# Patient Record
Sex: MALE | Race: WHITE
[De-identification: names, ages, dates, MRNs, and addresses within clinical notes are randomized per-mention and may not be internally consistent; named-entity substitution may affect disease eponyms.]

---

## 2020-03-10 ENCOUNTER — HOSPITAL ENCOUNTER (EMERGENCY)
Dept: HOSPITAL 62 - ER | Age: 70
LOS: 1 days | Discharge: HOME | End: 2020-03-11
Payer: OTHER GOVERNMENT

## 2020-03-10 DIAGNOSIS — R10.32: ICD-10-CM

## 2020-03-10 DIAGNOSIS — N13.9: Primary | ICD-10-CM

## 2020-03-10 DIAGNOSIS — Z79.82: ICD-10-CM

## 2020-03-10 DIAGNOSIS — I71.4: ICD-10-CM

## 2020-03-10 LAB
ADD MANUAL DIFF: NO
ALBUMIN SERPL-MCNC: 4.7 G/DL (ref 3.5–5)
ALP SERPL-CCNC: 47 U/L (ref 38–126)
ANION GAP SERPL CALC-SCNC: 13 MMOL/L (ref 5–19)
AST SERPL-CCNC: 29 U/L (ref 17–59)
BASOPHILS # BLD AUTO: 0.1 10^3/UL (ref 0–0.2)
BASOPHILS NFR BLD AUTO: 0.9 % (ref 0–2)
BILIRUB DIRECT SERPL-MCNC: 0.4 MG/DL (ref 0–0.4)
BILIRUB SERPL-MCNC: 0.6 MG/DL (ref 0.2–1.3)
BUN SERPL-MCNC: 18 MG/DL (ref 7–20)
CALCIUM: 10 MG/DL (ref 8.4–10.2)
CHLORIDE SERPL-SCNC: 102 MMOL/L (ref 98–107)
CO2 SERPL-SCNC: 22 MMOL/L (ref 22–30)
EOSINOPHIL # BLD AUTO: 0.1 10^3/UL (ref 0–0.6)
EOSINOPHIL NFR BLD AUTO: 0.7 % (ref 0–6)
ERYTHROCYTE [DISTWIDTH] IN BLOOD BY AUTOMATED COUNT: 14.4 % (ref 11.5–14)
GLUCOSE SERPL-MCNC: 154 MG/DL (ref 75–110)
HCT VFR BLD CALC: 46.8 % (ref 37.9–51)
HGB BLD-MCNC: 16.6 G/DL (ref 13.5–17)
LYMPHOCYTES # BLD AUTO: 1.8 10^3/UL (ref 0.5–4.7)
LYMPHOCYTES NFR BLD AUTO: 11.5 % (ref 13–45)
MCH RBC QN AUTO: 31 PG (ref 27–33.4)
MCHC RBC AUTO-ENTMCNC: 35.4 G/DL (ref 32–36)
MCV RBC AUTO: 87 FL (ref 80–97)
MONOCYTES # BLD AUTO: 0.9 10^3/UL (ref 0.1–1.4)
MONOCYTES NFR BLD AUTO: 5.9 % (ref 3–13)
NEUTROPHILS # BLD AUTO: 12.8 10^3/UL (ref 1.7–8.2)
NEUTS SEG NFR BLD AUTO: 81 % (ref 42–78)
PLATELET # BLD: 263 10^3/UL (ref 150–450)
POTASSIUM SERPL-SCNC: 4.3 MMOL/L (ref 3.6–5)
PROT SERPL-MCNC: 8.6 G/DL (ref 6.3–8.2)
RBC # BLD AUTO: 5.35 10^6/UL (ref 4.35–5.55)
TOTAL CELLS COUNTED % (AUTO): 100 %
WBC # BLD AUTO: 15.9 10^3/UL (ref 4–10.5)

## 2020-03-10 PROCEDURE — 96374 THER/PROPH/DIAG INJ IV PUSH: CPT

## 2020-03-10 PROCEDURE — 85025 COMPLETE CBC W/AUTO DIFF WBC: CPT

## 2020-03-10 PROCEDURE — 96375 TX/PRO/DX INJ NEW DRUG ADDON: CPT

## 2020-03-10 PROCEDURE — 81001 URINALYSIS AUTO W/SCOPE: CPT

## 2020-03-10 PROCEDURE — 96361 HYDRATE IV INFUSION ADD-ON: CPT

## 2020-03-10 PROCEDURE — 80053 COMPREHEN METABOLIC PANEL: CPT

## 2020-03-10 PROCEDURE — 36415 COLL VENOUS BLD VENIPUNCTURE: CPT

## 2020-03-10 PROCEDURE — 83690 ASSAY OF LIPASE: CPT

## 2020-03-10 PROCEDURE — 74177 CT ABD & PELVIS W/CONTRAST: CPT

## 2020-03-10 PROCEDURE — 83605 ASSAY OF LACTIC ACID: CPT

## 2020-03-10 PROCEDURE — 99285 EMERGENCY DEPT VISIT HI MDM: CPT

## 2020-03-10 PROCEDURE — 96376 TX/PRO/DX INJ SAME DRUG ADON: CPT

## 2020-03-10 PROCEDURE — 51702 INSERT TEMP BLADDER CATH: CPT

## 2020-03-10 NOTE — ER DOCUMENT REPORT
ED GI/





- General


Stated Complaint: ABDOMINAL PAIN


Time Seen by Provider: 03/10/20 22:55


Primary Care Provider: 


SHARLA REMY MD [NO LOCAL MD] - Follow up as needed


Mode of Arrival: Medic


Information source: Patient


Notes: 





69-year-old man presents to the emergency department with severe lower abdominal

pain which she states began approximately 5 hours prior to arrival.  States he 

was sitting on a couch when he instantaneously developed a severe lower 

abdominal pain.  EMS was called and he was transported to the emergency 

department in severe/pain.  He was given 100 mcg of fentanyl, and states the 

pain has not been affected and he is still screaming secondary to the 

discomfort.  Denies prior history of similar episodes.  He has a history of 

atrial fibrillation, uses aspirin, smokes marijuana and takes no other 

medications.





- Related Data


Allergies/Adverse Reactions: 


                                        





No Known Allergies Allergy (Unverified 03/10/20 23:30)


   











Past Medical History





- Social History


Smoking Status: Unknown if Ever Smoked


Family History: Reviewed & Not Pertinent





Review of Systems





- Review of Systems


Notes: 





Constitutional: Negative for fever.


HENT: Negative for sore throat.


Eyes: Negative for visual changes.


Cardiovascular: Negative for chest pain.


Respiratory: Negative for shortness of breath.


Gastrointestinal: + Abdominal pain, no vomiting or diarrhea.


Genitourinary: Negative for dysuria.


Musculoskeletal: Negative for back pain.


Skin: Negative for rash.


Neurological: Negative for headaches, weakness or numbness.





10 point ROS negative except as marked above and in HPI.





Physical Exam





- Vital signs


Vitals: 


                                        











Temp


 


 98.3 F 


 


 03/10/20 23:00














- Notes


Notes: 





PHYSICAL EXAMINATION:


 


Physical Exam:


General: Well-nourished well-developed  in no acute distress


HEENT: NC/AT, pupils equal round and reactive to light, MM moist,nares clear, 

oropharynx clear, airway patent


Neck: supple, no adenopathy, no masses.  Good range of motion


Lungs: clear, no wheezing, no rales no rhonchi


CVS: Regular rate and rhythm no murmur gallop or rub


Abdomen: Soft, active, + lower abdominal pain with guarding, left lower quadrant

greater than right.  Good bowel sounds, no masses, no masses, no 

hepatosplenomegaly


Ext:   No edema, clubbing or cyanosis.


Neuro: Alert and responsive, moving all 4 extremities on command, cranial nerves

intact, no focal findings


Skin: Intact no open lesions, no rash


PSYCH: Normal mood, normal affect.


 








Course





- Re-evaluation


Re-evalutation: 





20 05:06


I reviewed the patient CT scan reveals a markedly distended bladder, Salas 

catheter was placed approximately 1000 cc of fluid drained.  Patient symptoms 

appear to be related to urinary obstruction, incidental finding of a abdominal 

aneurysm approximately 4 cm extending from below the infrarenal down to the 

bifurcation.  Patient notes that he was aware of a aneurysm and has not had 

follow-up recently.


20 05:46


Vascular surgery at [facility] was contacted Dr. Lantigua, after review of the images and discussion it is felt that the 

patient can follow-up in the clinic as an outpatient.  He notes that if he calls

for an appointment tomorrow he can be scheduled to be seen on Monday of next 

week.  I will shared this information with the patient and encouraged him to 

schedule follow-up for his 4 cm abdominal aortic aneurysm and question of a 

occlusion/stenosis in the left common iliac artery.  





- Vital Signs


Vital signs: 


                                        











Temp Pulse Resp BP Pulse Ox


 


 98.1 F   88   18   154/72 H  97 


 


 20 03:30  20 03:30  20 03:30  20 03:30  20 03:30














- Laboratory


Result Diagrams: 


                                 03/10/20 22:50





                                 03/10/20 22:50


Laboratory results interpreted by me: 


                                        











  03/10/20 03/10/20 03/11/20





  22:50 22:50 00:50


 


WBC  15.9 H  


 


RDW  14.4 H  


 


Lymph % (Auto)  11.5 L  


 


Absolute Neuts (auto)  12.8 H  


 


Seg Neutrophils %  81.0 H  


 


Glucose   154 H 


 


Total Protein   8.6 H 


 


Urine Ketones    TRACE H








I have reviewed laboratory data and used this information for the treatment 

decisions regarding the patient.





- Diagnostic Test


Radiology reviewed: Image reviewed, Reports reviewed - CT of the abdomen and 

pelvis with IV contrast: 4 cm abdominal aortic aneurysm, occlusion versus high-

grade stenosis of the left common iliac artery, distended bladder with 

approximately 1100 cc of fluid and no obstruction seen.





Discharge





- Discharge


Clinical Impression: 


 Urinary obstruction, Abdominal aortic aneurysm (AAA) >39 mm diameter





Condition: Good


Disposition: HOME, SELF-CARE


Additional Instructions: 


You were diagnosed with urinary obstruction in the emergency department today, 

keep the Salas catheter in place until he can follow-up with the urologist.  You

also found to have a 4 cm abdominal aortic aneurysm.  I have spoken to the 

vascular surgeon at [facility] in [facility], [place].  You can make an appointment with Dr. Shon Lantigua, [phone], location [address] [place], [place].  Please call for an 

appointment tomorrow and they can see you in the office early next week.





We will also need to follow-up with a urologist regarding your urinary 

obstruction and Salas catheter placement.  A referral will be provided with the 

discharge paperwork.





HOME CARE INSTRUCTIONS & INFORMATION:  Thank you for choosing us for your 

medical needs. We hope you're satisfied with the care you received.  After you 

leave, you must properly care for your problem and, at the same time, observe 

its progress.  Any condition can change.  Some illnesses can change rapidly over

hours or days.  If your condition worsens, return to the Emergency Department or

see your physician promptly.





ABOUT YOUR X-RAYS AND EKG'S:   If you had an EKG or X-rays taken, they have been

read by the Emergency Physician. The X-rays and EKG's will also be read by a 

Radiologist or Cardiologist within 24 hours.  If discrepancies are noted, you 

will be notified by telephone.  Please be certain the ED has a correct telephone

number & address where you can be reached.  Also, realize that some fractures or

abnormalities do not show up on initial X-rays.  If your symptoms continue, see 

your physician.





ABOUT YOUR LABORATORY TEST:   If you had laboratory tests, the results have been

reviewed by the Emergency Physician.  Some test results (for example cultures) 

may not be available for several days.  You will be contacted if any test result

shows you need additional treatment.  Please be certain the ED has a correct 

telephone number and address where you can be reached.





ABOUT YOUR MEDICATIONS:  You will receive instructions on how to take your 

medicine on the prescription label you receive.  Additional information may be 

provided by the Pharmacy.  If you have questions afterwards, call the ED for 

clarification or further instructions.  Some prescribed medications may cause 

drowsiness.  Do not perform tasks such as driving a car or operating machinery w

ithout consulting your Pharmacist.  If you feel you need a refill of pain 

medication, your condition will need re-evaluation.  Please do not call for a 

refill of any medication.





ABOUT YOUR SIGNATURE:   Signature of this document acknowledges to followin. Understanding that you received emergency treatment and that you may be 

released before al medical problems are known or treated. Please be certain   

the ED has a correct phone number & address where you can be reached.


   2. Acknowledgement that you will arrange for follow-up care as recommended.


   3. Authorization for the Emergency Physician to provide information to your 

follow-up Physician in order to maximize your care.





AT ANY TIME, IF YOUR SYMPTOMS CHANGE SIGNIFICANTLY OR WORSEN OR YOU DEVELOP NEW 

SYMPTOMS, RETURN TO THE EMERGENCY DEPARTMENT IMMEDIATELY FOR RE-EVALUATION.





OUR GOAL IS TO PROVIDE EXCELLENT MEDICAL CARE!





WE HOPE THAT WE HAVE MET YOUR EXPECTATIONS DURING YOUR EMERGENCY DEPARTMENT 

VISIT AND THAT YOU FEEL YOU HAVE RECEIVED EXCELLENT CARE!











Referrals: 


SHARLA REMY MD [NO LOCAL MD] - Follow up as needed

## 2020-03-11 VITALS — SYSTOLIC BLOOD PRESSURE: 152 MMHG | DIASTOLIC BLOOD PRESSURE: 92 MMHG

## 2020-03-11 LAB
ADD MANUAL MICROSCOPIC: YES
APPEARANCE UR: CLEAR
APTT PPP: (no result) S
BILIRUB UR QL STRIP: NEGATIVE
GLUCOSE UR STRIP-MCNC: NEGATIVE MG/DL
HYALINE CASTS #/AREA URNS LPF: (no result) /LPF
KETONES UR STRIP-MCNC: (no result) MG/DL
NITRITE UR QL STRIP: NEGATIVE
PH UR STRIP: 6 [PH] (ref 5–9)
PROT UR STRIP-MCNC: NEGATIVE MG/DL
RBC #/AREA URNS HPF: (no result) /HPF
SP GR UR STRIP: 1.01
UROBILINOGEN UR-MCNC: NEGATIVE MG/DL (ref ?–2)

## 2020-03-11 NOTE — RADIOLOGY REPORT (SQ)
EXAM DESCRIPTION:



[facility]Lex: CT ABDOMEN PELVIS WITH IV CONTRAST 



CLINICAL HISTORY: 

69 years  Male;  LLQ abdominal pain;



TECHNIQUE: 

CT of the abdomen and pelvis using intravenous contrast. 

All CT scans at this facility use dose modulation, iterative

reconstruction, and/or weight based dosing when appropriate to

reduce radiation dose to as low as reasonably achievable.



COMPARISON: None.



FINDINGS:



Abdomen: 

Liver:No focal lesions. No intrahepatic ductal distention.

Gallbladder:Nondistended

Pancreas:Within normal limits

Spleen:Within normal limits

Right kidney: Multiple small cysts. No hydronephrosis. Healed

left several small cysts. No hydronephrosis.

Left kidney:No hydronephrosis. No focal lesion.

Adrenal glands:Within normal limits

Vascular structures: An infrarenal abdominal aortic aneurysm with

mural thrombus measures maximum 4 cm in diameter, beginning

approximately 1 cm below the renal arteries and extending down to

the aortic bifurcation.

High-grade stenosis versus occlusion of the left common iliac

artery. There is filling of the left external iliac artery and

hypogastric.



Pelvis: 

Small bowel:No significant distention.

Appendix:Within normal limits

Colon: Large amount of fecal material in the distal sigmoid and

rectum. Moderate proximal colonic fecal retention, without

proximal distention.

No free intraperitoneal fluid or air. 

Bones: No acute bone findings.

Bladder: Distended, at least 1100 mL. No surrounding edema.

Prostate: 4.7 cm wide.

  

IMPRESSION:

1.  4 cm abdominal aortic aneurysm. Recommend vascular

consultation and annual follow-up.

2.  Occlusion versus high-grade stenosis of the left common iliac

artery

3.  Distended bladder, at least 1100 mL.

4.  No bowel obstruction or acute inflammatory changes.

## 2020-03-12 ENCOUNTER — HOSPITAL ENCOUNTER (EMERGENCY)
Dept: HOSPITAL 62 - ER | Age: 70
Discharge: LEFT BEFORE DISCHARGE | End: 2020-03-12
Payer: OTHER GOVERNMENT

## 2020-03-12 VITALS — DIASTOLIC BLOOD PRESSURE: 90 MMHG | SYSTOLIC BLOOD PRESSURE: 150 MMHG

## 2020-03-12 DIAGNOSIS — F12.10: ICD-10-CM

## 2020-03-12 DIAGNOSIS — F17.200: ICD-10-CM

## 2020-03-12 DIAGNOSIS — Z46.6: ICD-10-CM

## 2020-03-12 DIAGNOSIS — R39.198: Primary | ICD-10-CM

## 2020-03-12 LAB
APPEARANCE UR: CLEAR
APTT PPP: YELLOW S
BACTERIA #/AREA URNS HPF: (no result) /HPF
BILIRUB UR QL STRIP: NEGATIVE
GLUCOSE UR STRIP-MCNC: NEGATIVE MG/DL
KETONES UR STRIP-MCNC: NEGATIVE MG/DL
PH UR STRIP: 6 [PH] (ref 5–9)
PROT UR STRIP-MCNC: NEGATIVE MG/DL
RBC #/AREA URNS HPF: (no result) /HPF
SP GR UR STRIP: 1.01
UROBILINOGEN UR-MCNC: NEGATIVE MG/DL (ref ?–2)

## 2020-03-12 PROCEDURE — 87086 URINE CULTURE/COLONY COUNT: CPT

## 2020-03-12 PROCEDURE — 81001 URINALYSIS AUTO W/SCOPE: CPT

## 2020-03-12 PROCEDURE — 99281 EMR DPT VST MAYX REQ PHY/QHP: CPT

## 2020-03-12 NOTE — ER DOCUMENT REPORT
ED General





- General


Chief Complaint: Problem with Urinary Catheter


Stated Complaint: BLOOD IN CATHETER


Time Seen by Provider: 03/12/20 06:40


TRAVEL OUTSIDE OF THE [place] IN LAST 30 DAYS: No





- HPI


Notes: 





Chief complaint: Problem with Salas catheter





69-year-old male sent here by Dr. Landrum 2 days ago with acute urinary 

retention and required placement of a Salas catheter.  He was also incidentally 

noted to have an aortic aneurysm which did not appear to be acutely leaking and 

questionable occlusion of iliac artery.  He has been referred to a vascular 

surgeon and urologist.  He comes back in today saying that Salas catheter is 

causing him a lot of discomfort and he really wants this taken out if possible. 

He also notes he has had a tiny amount of bleeding at the urethral meatus 

staining his underwear and was concerned about this.  He is not running a fever 

or having chills or vomiting.  Interestingly he notes that he had a large number

of bowel movements after he was discharged and thinks that he may have had a 

fecal impaction.  He wonders if this could have been a contributing factor to 

his difficulty voiding.  He again reiterates that the Salas is very 

uncomfortable and he wants this taken out.  He is not yet made a follow-up 

appointment with urology and says he has problems with transportation.





- Related Data


Allergies/Adverse Reactions: 


                                        





No Known Allergies Allergy (Unverified 03/10/20 23:30)


   











Past Medical History





- General


Information source: Patient





- Social History


Smoking Status: Current Every Day Smoker


Chew tobacco use (# tins/day): No


Frequency of alcohol use: None


Drug Abuse: Marijuana


Family History: Reviewed & Not Pertinent


Patient has suicidal ideation: No


Patient has homicidal ideation: No





- Past Medical History


Cardiac Medical History: Reports: Hx Atrial Fibrillation, Hx Heart Attack


Past Surgical History: Reports: Hx Cardiac Surgery - x6 stents





Review of Systems





- Review of Systems


Notes: 





Constitutional: Negative for fever.


HENT: Negative for sore throat.


Eyes: Negative for visual changes.


Cardiovascular: Negative for chest pain.


Respiratory: Negative for shortness of breath.


Gastrointestinal: Negative for abdominal pain, vomiting or diarrhea.


Genitourinary: As per HPI.


Musculoskeletal: Negative for back pain.


Skin: Negative for rash.


Neurological: Negative for headaches, weakness or numbness.





10 point ROS negative except as marked above and in HPI.








Physical Exam





- Vital signs


Vitals: 


                                        











Temp Pulse Resp BP Pulse Ox


 


 98.1 F   93   18   150/90 H  97 


 


 03/12/20 06:14  03/12/20 06:14  03/12/20 06:14  03/12/20 06:14  03/12/20 06:14














- Notes


Notes: 











GENERAL: Well-developed well-nourished appearing restless and uncomfortable.





SKIN: Good turgor no rashes.





HEAD: Normocephalic atraumatic.





EYES: PERRLA.  EOMI.  Conjunctivae and sclerae clear.





EARS: CANALS AND TMS CLEAR.





NOSE: CLEAR.





MOUTH: Moist mucosa.  Good dentition.  No stridor or edema.  No drooling.





NECK: Supple.  No masses or thyromegaly.  No adenopathy.  Carotids 2+ without 

bruits.  No JVD.





BACK: Symmetrical without tenderness.





CHEST: Respirations unlabored.  Breath sounds clear and symmetrical.





HEART: Regular rhythm.  No murmur gallop or rub.





ABDOMEN: Soft nontender without masses, organomegaly or rebound.  Bowel sounds 

normally active.  No bruits.





GENITALIA: Salas catheter in situ.  Urine in the bag appears grossly clear.  He 

has a tiny amount of blood staining of his underwear which appears to come from 

around the urethral meatus..





EXTREMITIES: No edema.  No calf tenderness.  Cap refill less than 1.5 seconds.  

Dorsalis pedis and posterior tibial pulses 3+ and symmetrical.





NEUROLOGICAL: GCS 15.  Alert and oriented x3.  Normal gait.  Fluent speech.  

Cranial nerves II through XII intact.  Sensorimotor and cerebellar normal.  

Normal tone.





PSYCHIATRIC: Appropriate affect.





Course





- Re-evaluation


Re-evalutation: 





03/12/20 07:02


I will intact Salas give this man a voiding trial.  If he passes this we will 

try to help him make arrangements for outpatient urology referral.  I am also 

going to check a urinalysis on the specimen from the tubing before we remove the

catheter.


03/12/20 08:47


We took Mr. Javed symptoms catheter out.  I was preoccupied immediately 

thereafter caring for trauma patient.  When I went back to reassess Mr. Soria 

and he had eloped from the facility.  I have asked charge nurse to call him on 

the telephone and strongly encouraged him to return here for any additional 

problems and to follow-up with urology as soon as possible.





- Vital Signs


Vital signs: 


                                        











Temp Pulse Resp BP Pulse Ox


 


 98.1 F   93   18   150/90 H  97 


 


 03/12/20 06:14  03/12/20 06:14  03/12/20 06:14  03/12/20 06:14  03/12/20 06:14














- Laboratory


Laboratory results interpreted by me: 


                                        











  03/12/20





  07:04


 


Urine Blood  LARGE H


 


Leukocyte Esterase Rfl  TRACE H














Discharge





- Discharge


Clinical Impression: 


 Symptom of bladder outlet obstruction





Disposition: ELOPED